# Patient Record
Sex: MALE | Race: WHITE | NOT HISPANIC OR LATINO | Employment: FULL TIME | URBAN - METROPOLITAN AREA
[De-identification: names, ages, dates, MRNs, and addresses within clinical notes are randomized per-mention and may not be internally consistent; named-entity substitution may affect disease eponyms.]

---

## 2023-03-22 ENCOUNTER — TELEPHONE (OUTPATIENT)
Dept: INTERNAL MEDICINE | Facility: CLINIC | Age: 38
End: 2023-03-22
Payer: COMMERCIAL

## 2023-03-22 NOTE — TELEPHONE ENCOUNTER
----- Message from Renuka Fowler sent at 3/21/2023  4:49 PM CDT -----  Regarding: np appoinment request  Name of Who is Calling:EWA MARTINEZ [[id number]]          What is the request in detail: np appointment requested           Can the clinic reply by [facility]: no             What Number to Call Back if not in [facility]: [phone] (home)

## 2023-03-22 NOTE — TELEPHONE ENCOUNTER
Called in attempt to get pt scheduled for an appt. Pt phone didn't ring and went right to the voicemail. Left a voicemail for the pt to call the office back to get an appt set up.

## 2023-05-03 NOTE — PROGRESS NOTES
Subjective:      Chief Complaint: Annual Exam and Establish Care    HPI  Mr. Akil Botello is a 36 yo M with reported (albeit transient) history of fatty liver disease presenting as a new Pt to establish primary care.  Of note, semi recently moved to [place] from [place] (works in oil and gas; [employer]), and has his 2nd son on the way any day now.    Warts:  - bilateral hands  - history of cryotherapy  - interested in dermatology (re)establishment    Family, social, surgical Hx reviewed     Health Maintenance:  Due for annual screening labs and vaccinations (COVID booster)    No past medical history on file.  Past Surgical History:   Procedure Laterality Date    GUM SURGERY      RETINAL DETACHMENT SURGERY       Family History   Problem Relation Age of Onset    Breast cancer Maternal Grandmother     Lung cancer Maternal Grandfather     Cancer Paternal Grandmother     Brain cancer Paternal Grandfather      Social History     Socioeconomic History    Marital status:    Tobacco Use    Smoking status: Never    Smokeless tobacco: Never     Review of patient's allergies indicates:  No Known Allergies  Akil Botello had no medications administered during this visit.      Review of Systems   Constitutional:  Negative for appetite change, chills and fever.   HENT: Negative.     Respiratory:  Negative for cough, chest tightness and shortness of breath.    Cardiovascular:  Negative for chest pain, palpitations and leg swelling.   Gastrointestinal:  Negative for abdominal distention, abdominal pain, blood in stool, constipation, diarrhea, nausea and vomiting.   Endocrine: Negative.    Genitourinary:  Negative for difficulty urinating, dysuria, frequency and hematuria.   Musculoskeletal: Negative.    Integumentary:  Positive for mole/lesion. Negative.   Neurological: Negative.    Psychiatric/Behavioral: Negative.         Objective:      Vitals:    05/04/23 0842   BP: 104/62   Pulse: 70   Resp: 16   Temp: 97.7 °F (36.5 °C)       Physical Exam  Vitals reviewed.   Constitutional:       General: He is not in acute distress.     Appearance: Normal appearance.   HENT:      Head: Normocephalic and atraumatic.      Comments: Facial features are symmetric      Nose: Nose normal. No congestion or rhinorrhea.      Mouth/Throat:      Mouth: Mucous membranes are moist.      Pharynx: Oropharynx is clear. No oropharyngeal exudate or posterior oropharyngeal erythema.   Eyes:      General: No scleral icterus.     Extraocular Movements: Extraocular movements intact.      Conjunctiva/sclera: Conjunctivae normal.   Cardiovascular:      Rate and Rhythm: Normal rate and regular rhythm.      Pulses: Normal pulses.      Heart sounds: Normal heart sounds.   Pulmonary:      Effort: Pulmonary effort is normal. No respiratory distress.      Breath sounds: Normal breath sounds.   Musculoskeletal:         General: No deformity or signs of injury. Normal range of motion.      Cervical back: Normal range of motion.      Comments: Gait normal    Skin:     General: Skin is warm and dry.      Findings: No rash.      Comments: Warts noted to bilateral hands   Neurological:      General: No focal deficit present.      Mental Status: He is alert and oriented to person, place, and time. Mental status is at baseline.   Psychiatric:         Mood and Affect: Mood normal.         Behavior: Behavior normal.         Thought Content: Thought content normal.     No current outpatient medications on file prior to visit.     No current facility-administered medications on file prior to visit.         Assessment:       1. Physical exam, annual    2. Viral warts, unspecified type        Plan:       Physical exam, annual  -     CBC Auto Differential; Future; Expected date: 05/04/2023  -     Comprehensive Metabolic Panel; Future; Expected date: 05/04/2023  -     Hemoglobin A1C; Future; Expected date: 05/04/2023  -     Cancel: Hepatitis C Antibody; Future; Expected date: 05/04/2023  -      HIV 1/2 Ag/Ab (4th Gen); Future; Expected date: 05/04/2023  -     Lipid Panel; Future; Expected date: 05/04/2023  -     T4; Future; Expected date: 05/04/2023  -     TSH; Future; Expected date: 05/04/2023  -     Vitamin D; Future; Expected date: 05/04/2023  -     Hepatitis Panel, Acute; Future; Expected date: 05/04/2023   - baseline/annual screening labs ordered    - given reported history of liver enzyme elevation, have changed isolated hepatitis-C screening to broader hepatitis panel    Viral warts, unspecified type  -     Ambulatory referral/consult to Dermatology; Future; Expected date: 05/11/2023   - recommendations and interventions appreciated      Return to clinic in one year for annual exam or sooner if dictated by labs or illness.

## 2023-05-04 ENCOUNTER — OFFICE VISIT (OUTPATIENT)
Dept: INTERNAL MEDICINE | Facility: CLINIC | Age: 38
End: 2023-05-04
Payer: COMMERCIAL

## 2023-05-04 VITALS
RESPIRATION RATE: 16 BRPM | OXYGEN SATURATION: 98 % | WEIGHT: 149.5 LBS | BODY MASS INDEX: 20.93 KG/M2 | HEIGHT: 71 IN | TEMPERATURE: 98 F | DIASTOLIC BLOOD PRESSURE: 62 MMHG | SYSTOLIC BLOOD PRESSURE: 104 MMHG | HEART RATE: 70 BPM

## 2023-05-04 DIAGNOSIS — B07.9 VIRAL WARTS, UNSPECIFIED TYPE: ICD-10-CM

## 2023-05-04 DIAGNOSIS — Z00.00 PHYSICAL EXAM, ANNUAL: Primary | ICD-10-CM

## 2023-05-04 PROCEDURE — 1159F MED LIST DOCD IN RCRD: CPT | Mod: CPTII,S$GLB,, | Performed by: STUDENT IN AN ORGANIZED HEALTH CARE EDUCATION/TRAINING PROGRAM

## 2023-05-04 PROCEDURE — 1159F PR MEDICATION LIST DOCUMENTED IN MEDICAL RECORD: ICD-10-PCS | Mod: CPTII,S$GLB,, | Performed by: STUDENT IN AN ORGANIZED HEALTH CARE EDUCATION/TRAINING PROGRAM

## 2023-05-04 PROCEDURE — 99395 PR PREVENTIVE VISIT,EST,18-39: ICD-10-PCS | Mod: S$GLB,,, | Performed by: STUDENT IN AN ORGANIZED HEALTH CARE EDUCATION/TRAINING PROGRAM

## 2023-05-04 PROCEDURE — 3008F PR BODY MASS INDEX (BMI) DOCUMENTED: ICD-10-PCS | Mod: CPTII,S$GLB,, | Performed by: STUDENT IN AN ORGANIZED HEALTH CARE EDUCATION/TRAINING PROGRAM

## 2023-05-04 PROCEDURE — 99999 PR PBB SHADOW E&M-EST. PATIENT-LVL IV: ICD-10-PCS | Mod: PBBFAC,,, | Performed by: STUDENT IN AN ORGANIZED HEALTH CARE EDUCATION/TRAINING PROGRAM

## 2023-05-04 PROCEDURE — 99999 PR PBB SHADOW E&M-EST. PATIENT-LVL IV: CPT | Mod: PBBFAC,,, | Performed by: STUDENT IN AN ORGANIZED HEALTH CARE EDUCATION/TRAINING PROGRAM

## 2023-05-04 PROCEDURE — 3008F BODY MASS INDEX DOCD: CPT | Mod: CPTII,S$GLB,, | Performed by: STUDENT IN AN ORGANIZED HEALTH CARE EDUCATION/TRAINING PROGRAM

## 2023-05-04 PROCEDURE — 99395 PREV VISIT EST AGE 18-39: CPT | Mod: S$GLB,,, | Performed by: STUDENT IN AN ORGANIZED HEALTH CARE EDUCATION/TRAINING PROGRAM

## 2023-05-04 PROCEDURE — 3074F PR MOST RECENT SYSTOLIC BLOOD PRESSURE < 130 MM HG: ICD-10-PCS | Mod: CPTII,S$GLB,, | Performed by: STUDENT IN AN ORGANIZED HEALTH CARE EDUCATION/TRAINING PROGRAM

## 2023-05-04 PROCEDURE — 3078F DIAST BP <80 MM HG: CPT | Mod: CPTII,S$GLB,, | Performed by: STUDENT IN AN ORGANIZED HEALTH CARE EDUCATION/TRAINING PROGRAM

## 2023-05-04 PROCEDURE — 3074F SYST BP LT 130 MM HG: CPT | Mod: CPTII,S$GLB,, | Performed by: STUDENT IN AN ORGANIZED HEALTH CARE EDUCATION/TRAINING PROGRAM

## 2023-05-04 PROCEDURE — 3078F PR MOST RECENT DIASTOLIC BLOOD PRESSURE < 80 MM HG: ICD-10-PCS | Mod: CPTII,S$GLB,, | Performed by: STUDENT IN AN ORGANIZED HEALTH CARE EDUCATION/TRAINING PROGRAM

## 2023-05-05 ENCOUNTER — LAB VISIT (OUTPATIENT)
Dept: LAB | Facility: OTHER | Age: 38
End: 2023-05-05
Attending: STUDENT IN AN ORGANIZED HEALTH CARE EDUCATION/TRAINING PROGRAM
Payer: COMMERCIAL

## 2023-05-05 DIAGNOSIS — Z00.00 PHYSICAL EXAM, ANNUAL: ICD-10-CM

## 2023-05-05 LAB
25(OH)D3+25(OH)D2 SERPL-MCNC: 40 NG/ML (ref 30–96)
ALBUMIN SERPL BCP-MCNC: 4.3 G/DL (ref 3.5–5.2)
ALP SERPL-CCNC: 52 U/L (ref 55–135)
ALT SERPL W/O P-5'-P-CCNC: 22 U/L (ref 10–44)
ANION GAP SERPL CALC-SCNC: 9 MMOL/L (ref 8–16)
AST SERPL-CCNC: 28 U/L (ref 10–40)
BASOPHILS # BLD AUTO: 0.04 K/UL (ref 0–0.2)
BASOPHILS NFR BLD: 0.9 % (ref 0–1.9)
BILIRUB SERPL-MCNC: 0.7 MG/DL (ref 0.1–1)
BUN SERPL-MCNC: 12 MG/DL (ref 6–20)
CALCIUM SERPL-MCNC: 9.4 MG/DL (ref 8.7–10.5)
CHLORIDE SERPL-SCNC: 102 MMOL/L (ref 95–110)
CHOLEST SERPL-MCNC: 153 MG/DL (ref 120–199)
CHOLEST/HDLC SERPL: 2.5 {RATIO} (ref 2–5)
CO2 SERPL-SCNC: 28 MMOL/L (ref 23–29)
CREAT SERPL-MCNC: 1 MG/DL (ref 0.5–1.4)
DIFFERENTIAL METHOD: ABNORMAL
EOSINOPHIL # BLD AUTO: 0.1 K/UL (ref 0–0.5)
EOSINOPHIL NFR BLD: 2 % (ref 0–8)
ERYTHROCYTE [DISTWIDTH] IN BLOOD BY AUTOMATED COUNT: 11.8 % (ref 11.5–14.5)
EST. GFR  (NO RACE VARIABLE): >60 ML/MIN/1.73 M^2
ESTIMATED AVG GLUCOSE: 94 MG/DL (ref 68–131)
GLUCOSE SERPL-MCNC: 88 MG/DL (ref 70–110)
HAV IGM SERPL QL IA: NORMAL
HBA1C MFR BLD: 4.9 % (ref 4–5.6)
HBV CORE IGM SERPL QL IA: NORMAL
HBV SURFACE AG SERPL QL IA: NORMAL
HCT VFR BLD AUTO: 45.9 % (ref 40–54)
HCV AB SERPL QL IA: NORMAL
HDLC SERPL-MCNC: 62 MG/DL (ref 40–75)
HDLC SERPL: 40.5 % (ref 20–50)
HGB BLD-MCNC: 16.1 G/DL (ref 14–18)
HIV 1+2 AB+HIV1 P24 AG SERPL QL IA: NORMAL
IMM GRANULOCYTES # BLD AUTO: 0 K/UL (ref 0–0.04)
IMM GRANULOCYTES NFR BLD AUTO: 0 % (ref 0–0.5)
LDLC SERPL CALC-MCNC: 77.2 MG/DL (ref 63–159)
LYMPHOCYTES # BLD AUTO: 2.8 K/UL (ref 1–4.8)
LYMPHOCYTES NFR BLD: 62.6 % (ref 18–48)
MCH RBC QN AUTO: 33.4 PG (ref 27–31)
MCHC RBC AUTO-ENTMCNC: 35.1 G/DL (ref 32–36)
MCV RBC AUTO: 95 FL (ref 82–98)
MONOCYTES # BLD AUTO: 0.5 K/UL (ref 0.3–1)
MONOCYTES NFR BLD: 11.3 % (ref 4–15)
NEUTROPHILS # BLD AUTO: 1 K/UL (ref 1.8–7.7)
NEUTROPHILS NFR BLD: 23.2 % (ref 38–73)
NONHDLC SERPL-MCNC: 91 MG/DL
NRBC BLD-RTO: 0 /100 WBC
PLATELET # BLD AUTO: 209 K/UL (ref 150–450)
PMV BLD AUTO: 9.6 FL (ref 9.2–12.9)
POTASSIUM SERPL-SCNC: 3.9 MMOL/L (ref 3.5–5.1)
PROT SERPL-MCNC: 7.1 G/DL (ref 6–8.4)
RBC # BLD AUTO: 4.82 M/UL (ref 4.6–6.2)
SODIUM SERPL-SCNC: 139 MMOL/L (ref 136–145)
T4 SERPL-MCNC: 5.9 UG/DL (ref 4.5–11.5)
TRIGL SERPL-MCNC: 69 MG/DL (ref 30–150)
TSH SERPL DL<=0.005 MIU/L-ACNC: 2.58 UIU/ML (ref 0.4–4)
WBC # BLD AUTO: 4.44 K/UL (ref 3.9–12.7)

## 2023-05-05 PROCEDURE — 80061 LIPID PANEL: CPT | Performed by: STUDENT IN AN ORGANIZED HEALTH CARE EDUCATION/TRAINING PROGRAM

## 2023-05-05 PROCEDURE — 80074 ACUTE HEPATITIS PANEL: CPT | Performed by: STUDENT IN AN ORGANIZED HEALTH CARE EDUCATION/TRAINING PROGRAM

## 2023-05-05 PROCEDURE — 80053 COMPREHEN METABOLIC PANEL: CPT | Performed by: STUDENT IN AN ORGANIZED HEALTH CARE EDUCATION/TRAINING PROGRAM

## 2023-05-05 PROCEDURE — 83036 HEMOGLOBIN GLYCOSYLATED A1C: CPT | Performed by: STUDENT IN AN ORGANIZED HEALTH CARE EDUCATION/TRAINING PROGRAM

## 2023-05-05 PROCEDURE — 87389 HIV-1 AG W/HIV-1&-2 AB AG IA: CPT | Performed by: STUDENT IN AN ORGANIZED HEALTH CARE EDUCATION/TRAINING PROGRAM

## 2023-05-05 PROCEDURE — 85025 COMPLETE CBC W/AUTO DIFF WBC: CPT | Performed by: STUDENT IN AN ORGANIZED HEALTH CARE EDUCATION/TRAINING PROGRAM

## 2023-05-05 PROCEDURE — 36415 COLL VENOUS BLD VENIPUNCTURE: CPT | Performed by: STUDENT IN AN ORGANIZED HEALTH CARE EDUCATION/TRAINING PROGRAM

## 2023-05-05 PROCEDURE — 84436 ASSAY OF TOTAL THYROXINE: CPT | Performed by: STUDENT IN AN ORGANIZED HEALTH CARE EDUCATION/TRAINING PROGRAM

## 2023-05-05 PROCEDURE — 84443 ASSAY THYROID STIM HORMONE: CPT | Performed by: STUDENT IN AN ORGANIZED HEALTH CARE EDUCATION/TRAINING PROGRAM

## 2023-05-05 PROCEDURE — 82306 VITAMIN D 25 HYDROXY: CPT | Performed by: STUDENT IN AN ORGANIZED HEALTH CARE EDUCATION/TRAINING PROGRAM

## 2023-07-24 ENCOUNTER — TELEPHONE (OUTPATIENT)
Dept: DERMATOLOGY | Facility: CLINIC | Age: 38
End: 2023-07-24
Payer: COMMERCIAL

## 2023-07-24 NOTE — TELEPHONE ENCOUNTER
Hi, unfortunately  is out of the office on Mondays moving forward. We've rescheduled your appointment to Friday 09/22/23. If the day and time do not work, please contact our office at [phone]. I have been unable to reach you by phone.        Thank you,  Alyce

## 2023-09-15 ENCOUNTER — OFFICE VISIT (OUTPATIENT)
Dept: DERMATOLOGY | Facility: CLINIC | Age: 38
End: 2023-09-15
Payer: COMMERCIAL

## 2023-09-15 ENCOUNTER — PATIENT MESSAGE (OUTPATIENT)
Dept: DERMATOLOGY | Facility: CLINIC | Age: 38
End: 2023-09-15

## 2023-09-15 DIAGNOSIS — Z12.83 SKIN EXAM, SCREENING FOR CANCER: ICD-10-CM

## 2023-09-15 DIAGNOSIS — B07.9 VIRAL WARTS, UNSPECIFIED TYPE: Primary | ICD-10-CM

## 2023-09-15 DIAGNOSIS — Z76.89 ENCOUNTER FOR SKIN CARE: ICD-10-CM

## 2023-09-15 DIAGNOSIS — L91.8 SKIN TAG: ICD-10-CM

## 2023-09-15 DIAGNOSIS — D22.9 MULTIPLE BENIGN NEVI: ICD-10-CM

## 2023-09-15 PROCEDURE — 1160F RVW MEDS BY RX/DR IN RCRD: CPT | Mod: CPTII,S$GLB,, | Performed by: DERMATOLOGY

## 2023-09-15 PROCEDURE — 17110 PR DESTRUCTION BENIGN LESIONS UP TO 14: ICD-10-PCS | Mod: S$GLB,,, | Performed by: DERMATOLOGY

## 2023-09-15 PROCEDURE — 17110 DESTRUCTION B9 LES UP TO 14: CPT | Mod: S$GLB,,, | Performed by: DERMATOLOGY

## 2023-09-15 PROCEDURE — 99999 PR PBB SHADOW E&M-EST. PATIENT-LVL II: CPT | Mod: PBBFAC,,, | Performed by: DERMATOLOGY

## 2023-09-15 PROCEDURE — 1159F MED LIST DOCD IN RCRD: CPT | Mod: CPTII,S$GLB,, | Performed by: DERMATOLOGY

## 2023-09-15 PROCEDURE — 99999 PR PBB SHADOW E&M-EST. PATIENT-LVL II: ICD-10-PCS | Mod: PBBFAC,,, | Performed by: DERMATOLOGY

## 2023-09-15 PROCEDURE — 1159F PR MEDICATION LIST DOCUMENTED IN MEDICAL RECORD: ICD-10-PCS | Mod: CPTII,S$GLB,, | Performed by: DERMATOLOGY

## 2023-09-15 PROCEDURE — 99203 OFFICE O/P NEW LOW 30 MIN: CPT | Mod: 25,S$GLB,, | Performed by: DERMATOLOGY

## 2023-09-15 PROCEDURE — 3044F PR MOST RECENT HEMOGLOBIN A1C LEVEL <7.0%: ICD-10-PCS | Mod: CPTII,S$GLB,, | Performed by: DERMATOLOGY

## 2023-09-15 PROCEDURE — 3044F HG A1C LEVEL LT 7.0%: CPT | Mod: CPTII,S$GLB,, | Performed by: DERMATOLOGY

## 2023-09-15 PROCEDURE — 1160F PR REVIEW ALL MEDS BY PRESCRIBER/CLIN PHARMACIST DOCUMENTED: ICD-10-PCS | Mod: CPTII,S$GLB,, | Performed by: DERMATOLOGY

## 2023-09-15 PROCEDURE — 99203 PR OFFICE/OUTPT VISIT, NEW, LEVL III, 30-44 MIN: ICD-10-PCS | Mod: 25,S$GLB,, | Performed by: DERMATOLOGY

## 2023-09-15 NOTE — PROGRESS NOTES
Subjective:      Patient ID:  Akil Botello is a 38 y.o. male who presents for   Chief Complaint   Patient presents with    Skin Check     TBSE     HPI    Review of Systems   Constitutional:  Negative for fever and chills.   Respiratory:  Negative for cough and shortness of breath.    Gastrointestinal:  Negative for nausea and vomiting.   Musculoskeletal:  Negative for joint swelling and arthralgias.   Skin:  Positive for daily sunscreen use (face) and activity-related sunscreen use. Negative for recent sunburn and wears hat.   All other systems reviewed and are negative.  Hematologic/Lymphatic: Does not bruise/bleed easily.       Objective:   Physical Exam   Constitutional: He appears well-developed and well-nourished. No distress.   Eyes: No conjunctival no injection.   Cardiovascular:  There is no dependent edema.             Neurological: He is alert and oriented to person, place, and time. He is not disoriented.   Psychiatric: He has a normal mood and affect.   Skin:   Areas Examined (abnormalities noted in diagram):   Scalp / Hair Palpated and Inspected  Head / Face Inspection Performed  Neck Inspection Performed  Chest / Axilla Inspection Performed  Abdomen Inspection Performed  Back Inspection Performed  RUE Inspected  LUE Inspection Performed  RLE Inspected  LLE Inspection Performed                         Diagram Legend     Erythematous scaling macule/papule c/w actinic keratosis       Vascular papule c/w angioma      Pigmented verrucoid papule/plaque c/w seborrheic keratosis      Yellow umbilicated papule c/w sebaceous hyperplasia      Irregularly shaped tan macule c/w lentigo     1-2 mm smooth white papules consistent with Milia      Movable subcutaneous cyst with punctum c/w epidermal inclusion cyst      Subcutaneous movable cyst c/w pilar cyst      Firm pink to brown papule c/w dermatofibroma      Pedunculated fleshy papule(s) c/w skin tag(s)      Evenly pigmented macule c/w junctional nevus      Mildly variegated pigmented, slightly irregular-bordered macule c/w mildly atypical nevus      Flesh colored to evenly pigmented papule c/w intradermal nevus       Pink pearly papule/plaque c/w basal cell carcinoma      Erythematous hyperkeratotic cursted plaque c/w SCC      Surgical scar with no sign of skin cancer recurrence      Open and closed comedones      Inflammatory papules and pustules      Verrucoid papule consistent consistent with wart     Erythematous eczematous patches and plaques     Dystrophic onycholytic nail with subungual debris c/w onychomycosis     Umbilicated papule    Erythematous-base heme-crusted tan verrucoid plaque consistent with inflamed seborrheic keratosis     Erythematous Silvery Scaling Plaque c/w Psoriasis     See annotation      Assessment / Plan:        Viral warts, unspecified type  -     Ambulatory referral/consult to Dermatology  Reviewed with patient different treatment options and associated risks.  Cryosurgery procedure note:    Verbal consent from the patient is obtained including, but not limited to, risk of hypopigmentation/hyperpigmentation, scar, recurrence of lesion. Liquid nitrogen cryosurgery is applied to 9 verruca with prior paring, as detailed in the physical exam, to produce a freeze injury. 3 consecutive freeze thaw cycles are applied to each verruca. The patient is aware that blisters (possibly blood blisters) may form.    Skin exam, screening for cancer  No other seriously suspicious lesions noted for body areas examined today.  Patient to inform of us if they notice any dark or changing or suspicious spots in areas not examined today.  Follow up for routine monitoring recommended to patient.    Instructed patient to watch out for dark spots, bleeding spots, crusty spots, sores that break out repeatedly in the same spot.  These characteristics are risk factors for skin cancer, and patient is to notify us if they experience any of these symptoms.    Encounter  for skin care  Patient instructed in importance in daily sun protection. Sun avoidance and topical protection discussed.     Patient encouraged to wear hat for all outdoor exposure.     Also discussed sun protective clothing.  Pt can call in prn for retin a script.  Pt to research what he wants to achieve with his skin of the face.      Multiple benign nevi  Discussed all of the following with the patient.    Patient to check their breasts (if applicable), buttocks, and groin with a mirror.  Patient deferred our examination of these areas today.    Each month, check your body for any spots such as freckles, age spots, and moles.  Watch for color changes and shape changes and growth in size.    Have your avendano or  pay attention to any dark color changes to moles of the scalp.    Moles, also called nevi, are small, colored (pigmented) marks on the skin. They have no known purpose. Many moles appear before age 30, but they also increase frequently as people age. Moles most often are not cancer (benign) and are harmless. But some become cancerous (malignant). Thats why you need to watch the moles on your body and tell your healthcare provider about any that concern you.  Brochure given for patient education.  Patient can monitor these lesions or lesion with their primary care provider during routine visits and notify us if there are any changes for reevaluation.  At a minimum they should keep their annual visits with their primary for check ups.    Skin tag  Discussed with patient the benign nature of these lesions and that no treatment is indicated.             Follow up in about 2 years (around 9/15/2025) for TBSE.

## 2023-09-15 NOTE — PATIENT INSTRUCTIONS
Discussed all of the following with the patient.    Patient to check their breasts (if applicable), buttocks, and groin with a mirror.  Patient deferred our examination of these areas today.    Each month, check your body for any spots such as freckles, age spots, and moles.  Watch for color changes and shape changes and growth in size.    Have your avendano or  pay attention to any dark color changes to moles of the scalp.    Moles, also called nevi, are small, colored (pigmented) marks on the skin. They have no known purpose. Many moles appear before age 30, but they also increase frequently as people age. Moles most often are not cancer (benign) and are harmless. But some become cancerous (malignant). Thats why you need to watch the moles on your body and tell your healthcare provider about any that concern you.    Patient instructed in importance in daily sun protection. Sun avoidance and topical protection discussed.     Patient encouraged to wear hat for all outdoor exposure.     Also discussed sun protective clothing.    Recommend sun block with plain zinc oxide or sun block products with some zinc or titanium as their base with an spf of at least 30 to be applied every 2-3 hours of outdoor exposure.  CRYOSURGERY      Your doctor has used a method called cryosurgery to treat your skin condition. Cryosurgery refers to the use of very cold substances to treat a variety of skin conditions such as warts, pre-skin cancers, molluscum contagiosum, sun spots, and several benign growths. The substance we use in cryosurgery is liquid nitrogen and is so cold (-195 degrees Celsius) that is burns when administered.     Following treatment in the office, the skin may immediately burn and become red. You may find the area around the lesion is affected as well. It is sometimes necessary to treat not only the lesion, but a small area of the surrounding normal skin to achieve a good response.     A blister, and even  a blood filled blister, may form after treatment.   This is a normal response. If the blister is painful, it is acceptable to sterilize a needle and with rubbing alcohol and gently pop the blister. It is important that you gently wash the area with soap and warm water as the blister fluid may contain wart virus if a wart was treated. Do no remove the roof of the blister.     The area treated can take anywhere from 1-3 weeks to heal. Healing time depends on the kind skin lesion treated, the location, and how aggressively the lesion was treated. It is recommended that the areas treated are covered with Vaseline or bacitracin ointment and a band-aid. If a band-aid is not practical, just ointment applied several times per day will do. Keeping these areas moist will speed the healing time.    Treatment with liquid nitrogen can leave a scar. In dark skin, it may be a light or dark scar, in light skin it may be a white or pink scar. These will generally fade with time, but may never go away completely.     If you have any concerns after your treatment, please feel free to call the office.       [facility] [place] [address]/ [phone] FAX/ [email or web address]

## 2023-09-20 RX ORDER — TRETINOIN 0.5 MG/G
CREAM TOPICAL NIGHTLY
Qty: 20 G | Refills: 3 | Status: SHIPPED | OUTPATIENT
Start: 2023-09-20 | End: 2024-03-04

## 2024-03-04 ENCOUNTER — OFFICE VISIT (OUTPATIENT)
Dept: INTERNAL MEDICINE | Facility: CLINIC | Age: 39
End: 2024-03-04
Payer: COMMERCIAL

## 2024-03-04 VITALS
HEIGHT: 70 IN | WEIGHT: 150.56 LBS | SYSTOLIC BLOOD PRESSURE: 122 MMHG | BODY MASS INDEX: 21.55 KG/M2 | OXYGEN SATURATION: 99 % | HEART RATE: 64 BPM | DIASTOLIC BLOOD PRESSURE: 78 MMHG

## 2024-03-04 DIAGNOSIS — K13.0 LESION OF LIP: Primary | ICD-10-CM

## 2024-03-04 PROCEDURE — 3078F DIAST BP <80 MM HG: CPT | Mod: CPTII,S$GLB,, | Performed by: INTERNAL MEDICINE

## 2024-03-04 PROCEDURE — 3008F BODY MASS INDEX DOCD: CPT | Mod: CPTII,S$GLB,, | Performed by: INTERNAL MEDICINE

## 2024-03-04 PROCEDURE — 1159F MED LIST DOCD IN RCRD: CPT | Mod: CPTII,S$GLB,, | Performed by: INTERNAL MEDICINE

## 2024-03-04 PROCEDURE — 3074F SYST BP LT 130 MM HG: CPT | Mod: CPTII,S$GLB,, | Performed by: INTERNAL MEDICINE

## 2024-03-04 PROCEDURE — 99214 OFFICE O/P EST MOD 30 MIN: CPT | Mod: S$GLB,,, | Performed by: INTERNAL MEDICINE

## 2024-03-04 PROCEDURE — 99999 PR PBB SHADOW E&M-EST. PATIENT-LVL IV: CPT | Mod: PBBFAC,,, | Performed by: INTERNAL MEDICINE

## 2024-03-04 RX ORDER — VALACYCLOVIR HYDROCHLORIDE 1 G/1
1000 TABLET, FILM COATED ORAL 2 TIMES DAILY PRN
Qty: 60 TABLET | Refills: 5 | Status: SHIPPED | OUTPATIENT
Start: 2024-03-04

## 2024-03-04 NOTE — PROGRESS NOTES
INTERNAL MEDICINE CLINIC - SAME DAY APPOINTMENT  Progress Note    PRESENTING HISTORY     PCP: Harmeet Isaacs MD    Chief Complaint/Reason for Visit:     Chief Complaint   Patient presents with    rash lip      History of Present Illness & ROS : Mr. Akil Botello is a 38 y.o. male.      Rash around lips for few months.    No pain. Has dry lips.      PAST HISTORY:     No past medical history on file.    Past Surgical History:   Procedure Laterality Date    GUM SURGERY      RETINAL DETACHMENT SURGERY         Family History   Problem Relation Age of Onset    Breast cancer Maternal Grandmother     Lung cancer Maternal Grandfather     Cancer Paternal Grandmother     Brain cancer Paternal Grandfather        Social History     Socioeconomic History    Marital status:    Tobacco Use    Smoking status: Never    Smokeless tobacco: Never     Social Determinants of Health     Financial Resource Strain: Low Risk  (3/4/2024)    Overall Financial Resource Strain (CARDIA)     Difficulty of Paying Living Expenses: Not hard at all   Food Insecurity: No Food Insecurity (3/4/2024)    Hunger Vital Sign     Worried About Running Out of Food in the Last Year: Never true     Ran Out of Food in the Last Year: Never true   Transportation Needs: No Transportation Needs (3/4/2024)    PRAPARE - Transportation     Lack of Transportation (Medical): No     Lack of Transportation (Non-Medical): No   Physical Activity: Sufficiently Active (3/4/2024)    Exercise Vital Sign     Days of Exercise per Week: 7 days     Minutes of Exercise per Session: 30 min   Stress: Stress Concern Present (3/4/2024)    [employer] [place] [employer] - Occupational Stress Questionnaire     Feeling of Stress : To some extent   Social Connections: Unknown (3/4/2024)    Social Connection and Isolation Panel [NHANES]     Frequency of Communication with Friends and Family: Twice a week     Frequency of Social Gatherings with Friends and Family: Once a  week     Active Member of Clubs or Organizations: Yes     Attends Club or Organization Meetings: More than 4 times per year     Marital Status:    Housing Stability: Low Risk  (3/4/2024)    Housing Stability Vital Sign     Unable to Pay for Housing in the Last Year: No     Number of Places Lived in the Last Year: 1     Unstable Housing in the Last Year: No       MEDICATIONS & ALLERGIES:     No current outpatient medications on file prior to visit.     No current facility-administered medications on file prior to visit.        Review of patient's allergies indicates:  No Known Allergies    Medications Reconciliation:   I have reconciled the patient's home medications with the patient/family. I have updated all changes.  Refer to After-Visit Medication List.    OBJECTIVE:     Vital Signs:  Vitals:    03/04/24 1557   BP: 122/78   Pulse: 64     Wt Readings from Last 3 Encounters:   03/04/24 1557 68.3 kg (150 lb 9.2 oz)   05/04/23 0842 67.8 kg (149 lb 7.6 oz)     Body mass index is 21.61 kg/m².     Physical Exam:  General: Well developed, well nourished. No distress.  HEENT: Head is normocephalic, atraumatic  Eyes: Clear conjunctiva.  Neck: Supple, symmetrical neck; trachea midline.  Lungs:normal respiratory effort.  Musculoskeletal: Normal gait.   Psychiatric: Normal affect. Alert.              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    Plan:  -     valACYclovir (VALTREX) 1000 MG tablet; Take 1 tablet (1,000 mg total) by mouth 2 (two) times daily as needed (oral lesions).   Dispense: 60 tablet; Refill: 5    If not better with treatment one week, then will refer to Dermatology.     Instructions for the patient:    Take 2 tablets every 12 hour X 2 occasions (for each attack). OR take 1 tablet twice daily until resolved.    Take B-complex vitamin daily.    After Visit Medication List :     Medication List            Accurate as of March 4, 2024 11:59 PM. If you have any questions, ask your nurse or doctor.                START taking these medications      valACYclovir 1000 MG tablet  Commonly known as: VALTREX  Take 1 tablet (1,000 mg total) by mouth 2 (two) times daily as needed (oral lesions).  Started by: Jaspreet Hayes MD            STOP taking these medications      tretinoin 0.05 % cream  Commonly known as: RETIN-A  Stopped by: Jaspreet Hayes MD               Where to Get Your Medications        These medications were sent to [id number] [address] [facility] [address]      Phone: [phone]   valACYclovir 1000 MG tablet         Signing Physician:  Jaspreet Hayes MD

## 2024-03-04 NOTE — PATIENT INSTRUCTIONS
Take 2 tablets every 12 hour X 2 occasions (for each attack). OR take 1 tablet twice daily until resolved.    Take B-complex vitamin daily.

## 2024-03-11 ENCOUNTER — PATIENT MESSAGE (OUTPATIENT)
Dept: INTERNAL MEDICINE | Facility: CLINIC | Age: 39
End: 2024-03-11
Payer: COMMERCIAL

## 2024-03-13 ENCOUNTER — TELEPHONE (OUTPATIENT)
Dept: HEPATOLOGY | Facility: CLINIC | Age: 39
End: 2024-03-13
Payer: COMMERCIAL

## 2024-03-13 ENCOUNTER — PATIENT MESSAGE (OUTPATIENT)
Dept: INTERNAL MEDICINE | Facility: CLINIC | Age: 39
End: 2024-03-13
Payer: COMMERCIAL

## 2024-03-13 DIAGNOSIS — K13.0 RASH ON LIPS: Primary | ICD-10-CM

## 2024-03-14 ENCOUNTER — OFFICE VISIT (OUTPATIENT)
Dept: DERMATOLOGY | Facility: CLINIC | Age: 39
End: 2024-03-14
Payer: COMMERCIAL

## 2024-03-14 DIAGNOSIS — K13.0 ANGULAR CHEILITIS: ICD-10-CM

## 2024-03-14 DIAGNOSIS — L71.0 PERIORAL DERMATITIS: Primary | ICD-10-CM

## 2024-03-14 PROCEDURE — 1159F MED LIST DOCD IN RCRD: CPT | Mod: CPTII,S$GLB,, | Performed by: DERMATOLOGY

## 2024-03-14 PROCEDURE — 1160F RVW MEDS BY RX/DR IN RCRD: CPT | Mod: CPTII,S$GLB,, | Performed by: DERMATOLOGY

## 2024-03-14 PROCEDURE — 99213 OFFICE O/P EST LOW 20 MIN: CPT | Mod: S$GLB,,, | Performed by: DERMATOLOGY

## 2024-03-14 PROCEDURE — 99999 PR PBB SHADOW E&M-EST. PATIENT-LVL III: CPT | Mod: PBBFAC,,, | Performed by: DERMATOLOGY

## 2024-03-14 RX ORDER — KETOCONAZOLE 20 MG/G
CREAM TOPICAL
Qty: 30 G | Refills: 1 | Status: SHIPPED | OUTPATIENT
Start: 2024-03-14

## 2024-03-14 NOTE — PROGRESS NOTES
Subjective:      Patient ID:  Akil Botello is a 38 y.o. male who presents for   Chief Complaint   Patient presents with    Acne     Face      Pt presents today for follow up after seeing his PCP and being treated for cold sores in the corners of his mouth. Reports associated breaking of the skin in the corners of his mouth. He was treated with Valtrex and has been using Aquaphor and neosporin. Reports very mild burning surrounding his lips after taking the Valtrex. Denies any pain with it.  Denies any changes in products or medications. Reports stress as of lately that could be a contributing factor.       Review of Systems   Skin:  Positive for itching, rash, daily sunscreen use, activity-related sunscreen use and wears hat. Negative for dry skin and recent sunburn.   Hematologic/Lymphatic: Does not bruise/bleed easily.       Objective:   Physical Exam   Constitutional: He appears well-developed and well-nourished. No distress.   Neurological: He is alert and oriented to person, place, and time. He is not disoriented.   Psychiatric: He has a normal mood and affect.   Skin:   Areas Examined (abnormalities noted in diagram):   Head / Face Inspection Performed            Diagram Legend     Erythematous scaling macule/papule c/w actinic keratosis       Vascular papule c/w angioma      Pigmented verrucoid papule/plaque c/w seborrheic keratosis      Yellow umbilicated papule c/w sebaceous hyperplasia      Irregularly shaped tan macule c/w lentigo     1-2 mm smooth white papules consistent with Milia      Movable subcutaneous cyst with punctum c/w epidermal inclusion cyst      Subcutaneous movable cyst c/w pilar cyst      Firm pink to brown papule c/w dermatofibroma      Pedunculated fleshy papule(s) c/w skin tag(s)      Evenly pigmented macule c/w junctional nevus     Mildly variegated pigmented, slightly irregular-bordered macule c/w mildly atypical nevus      Flesh colored to evenly pigmented papule c/w intradermal  nevus       Pink pearly papule/plaque c/w basal cell carcinoma      Erythematous hyperkeratotic cursted plaque c/w SCC      Surgical scar with no sign of skin cancer recurrence      Open and closed comedones      Inflammatory papules and pustules      Verrucoid papule consistent consistent with wart     Erythematous eczematous patches and plaques     Dystrophic onycholytic nail with subungual debris c/w onychomycosis     Umbilicated papule    Erythematous-base heme-crusted tan verrucoid plaque consistent with inflamed seborrheic keratosis     Erythematous Silvery Scaling Plaque c/w Psoriasis     See annotation      Assessment / Plan:        Perioral dermatitis/Angular cheilitis   -     Ambulatory referral/consult to Dermatology  -     ketoconazole (NIZORAL) 2 % cream; AAA in the corners of mouth bid for at least 7-10 days  Dispense: 30 g; Refill: 1  -  Also given Exelderm sample to use BID in the corners of the mouth 7-10 days; discussed using whichever is easier to get in the beard area  - Discussed that he should let us know if he is not getting relief in 7-10 days in case an antibiotic needs to be ordered              Follow up if symptoms worsen or fail to improve.

## 2024-03-14 NOTE — PATIENT INSTRUCTIONS
Medications prescribed/given today:  -     ketoconazole (NIZORAL) 2 % cream; AAA in the corners of mouth bid for at least 7-10 days  Dispense: 30 g; Refill: 1  -  Also given Exelderm sample to use BID in the corners of the mouth 7-10 days; discussed using whichever is easier to get in the beard area    Reach out in 7-10 days to let us know how the cream is working out in case an antibiotic doxycycline  needs to be ordered.

## 2024-03-15 ENCOUNTER — PATIENT MESSAGE (OUTPATIENT)
Dept: DERMATOLOGY | Facility: CLINIC | Age: 39
End: 2024-03-15
Payer: COMMERCIAL

## 2024-03-15 DIAGNOSIS — L71.0 PERIORAL DERMATITIS: Primary | ICD-10-CM

## 2024-03-27 RX ORDER — DOXYCYCLINE HYCLATE 100 MG
TABLET ORAL
Qty: 14 TABLET | Refills: 1 | Status: SHIPPED | OUTPATIENT
Start: 2024-03-27

## 2024-12-07 NOTE — PROGRESS NOTES
32-year-old male   Presents for an annual routine visit    Overall in general he has felt well.    Medical history   Retinal detachment surgery years ago   Prior gum surgery    Social history   Tobacco use none   Alcohol use couple drinks a week   Exercise on a regular basis with the aerobic and resistant training    Family history   Mother hypertension   Father no major health condition   No siblings      Medications   None    Review of symptoms   Negative for chest pain, palpitations, shortness for breath, abdominal pain.  Regular bowel function, in the past he has had bouts of rectal bleeding but the last was 6 months ago.  No difficulty urinating occasional nocturia.  Occasional heartburn not frequent   No arthralgias headaches    Examination   Weight 147 lb   BMI 21.16   Pulse 68   Blood pressure 122/62   HEENT exam, no abnormal findings   Neck, no thyromegaly no masses   Chest, clear breath sounds good effort   Heart, regular rate rhythm  Abdominal exam, soft nontender no hepatosplenomegaly abdominal masses bruits  Pulses 2+ carotid pulses no bruits 2+ dorsalis pedal pulses   Extremities, no edema   Lymph gland, no palpable adenopathy   Genitalia no scrotal masses no hernias   Skin, no gross abnormal findings   Musculoskeletal no gross abnormal findings    Impression   General examination    Plan   Routine labs of chemistry lipid CBC TSH   Continue with attention appropriate diet physical activity

## 2024-12-09 ENCOUNTER — OFFICE VISIT (OUTPATIENT)
Dept: INTERNAL MEDICINE | Facility: CLINIC | Age: 39
End: 2024-12-09
Payer: COMMERCIAL

## 2024-12-09 VITALS
WEIGHT: 147.5 LBS | HEART RATE: 72 BPM | OXYGEN SATURATION: 98 % | BODY MASS INDEX: 21.16 KG/M2 | SYSTOLIC BLOOD PRESSURE: 116 MMHG | DIASTOLIC BLOOD PRESSURE: 64 MMHG

## 2024-12-09 DIAGNOSIS — Z00.00 PHYSICAL EXAM, ANNUAL: Primary | ICD-10-CM

## 2024-12-09 PROCEDURE — 1160F RVW MEDS BY RX/DR IN RCRD: CPT | Mod: CPTII,S$GLB,, | Performed by: INTERNAL MEDICINE

## 2024-12-09 PROCEDURE — 99395 PREV VISIT EST AGE 18-39: CPT | Mod: S$GLB,,, | Performed by: INTERNAL MEDICINE

## 2024-12-09 PROCEDURE — 3074F SYST BP LT 130 MM HG: CPT | Mod: CPTII,S$GLB,, | Performed by: INTERNAL MEDICINE

## 2024-12-09 PROCEDURE — 3078F DIAST BP <80 MM HG: CPT | Mod: CPTII,S$GLB,, | Performed by: INTERNAL MEDICINE

## 2024-12-09 PROCEDURE — 1159F MED LIST DOCD IN RCRD: CPT | Mod: CPTII,S$GLB,, | Performed by: INTERNAL MEDICINE

## 2024-12-09 PROCEDURE — 3008F BODY MASS INDEX DOCD: CPT | Mod: CPTII,S$GLB,, | Performed by: INTERNAL MEDICINE

## 2024-12-09 PROCEDURE — 99999 PR PBB SHADOW E&M-EST. PATIENT-LVL III: CPT | Mod: PBBFAC,,, | Performed by: INTERNAL MEDICINE

## 2024-12-13 ENCOUNTER — LAB VISIT (OUTPATIENT)
Dept: LAB | Facility: OTHER | Age: 39
End: 2024-12-13
Attending: INTERNAL MEDICINE
Payer: COMMERCIAL

## 2024-12-13 ENCOUNTER — PATIENT MESSAGE (OUTPATIENT)
Dept: INTERNAL MEDICINE | Facility: CLINIC | Age: 39
End: 2024-12-13
Payer: COMMERCIAL

## 2024-12-13 DIAGNOSIS — Z00.00 PHYSICAL EXAM, ANNUAL: ICD-10-CM

## 2024-12-13 LAB
ALBUMIN SERPL BCP-MCNC: 4.3 G/DL (ref 3.5–5.2)
ALP SERPL-CCNC: 62 U/L (ref 40–150)
ALT SERPL W/O P-5'-P-CCNC: 33 U/L (ref 10–44)
ANION GAP SERPL CALC-SCNC: 9 MMOL/L (ref 8–16)
AST SERPL-CCNC: 36 U/L (ref 10–40)
BASOPHILS # BLD AUTO: 0.04 K/UL (ref 0–0.2)
BASOPHILS NFR BLD: 0.6 % (ref 0–1.9)
BILIRUB SERPL-MCNC: 0.8 MG/DL (ref 0.1–1)
BUN SERPL-MCNC: 12 MG/DL (ref 6–20)
CALCIUM SERPL-MCNC: 9.9 MG/DL (ref 8.7–10.5)
CHLORIDE SERPL-SCNC: 103 MMOL/L (ref 95–110)
CHOLEST SERPL-MCNC: 147 MG/DL (ref 120–199)
CHOLEST/HDLC SERPL: 2.3 {RATIO} (ref 2–5)
CO2 SERPL-SCNC: 26 MMOL/L (ref 23–29)
CREAT SERPL-MCNC: 1 MG/DL (ref 0.5–1.4)
DIFFERENTIAL METHOD BLD: ABNORMAL
EOSINOPHIL # BLD AUTO: 0.2 K/UL (ref 0–0.5)
EOSINOPHIL NFR BLD: 2.3 % (ref 0–8)
ERYTHROCYTE [DISTWIDTH] IN BLOOD BY AUTOMATED COUNT: 11.7 % (ref 11.5–14.5)
EST. GFR  (NO RACE VARIABLE): >60 ML/MIN/1.73 M^2
GLUCOSE SERPL-MCNC: 89 MG/DL (ref 70–110)
HCT VFR BLD AUTO: 47.7 % (ref 40–54)
HDLC SERPL-MCNC: 64 MG/DL (ref 40–75)
HDLC SERPL: 43.5 % (ref 20–50)
HGB BLD-MCNC: 16.3 G/DL (ref 14–18)
IMM GRANULOCYTES # BLD AUTO: 0.02 K/UL (ref 0–0.04)
IMM GRANULOCYTES NFR BLD AUTO: 0.3 % (ref 0–0.5)
LDLC SERPL CALC-MCNC: 71.4 MG/DL (ref 63–159)
LYMPHOCYTES # BLD AUTO: 2.8 K/UL (ref 1–4.8)
LYMPHOCYTES NFR BLD: 40.1 % (ref 18–48)
MCH RBC QN AUTO: 32.3 PG (ref 27–31)
MCHC RBC AUTO-ENTMCNC: 34.2 G/DL (ref 32–36)
MCV RBC AUTO: 95 FL (ref 82–98)
MONOCYTES # BLD AUTO: 0.9 K/UL (ref 0.3–1)
MONOCYTES NFR BLD: 13.2 % (ref 4–15)
NEUTROPHILS # BLD AUTO: 3 K/UL (ref 1.8–7.7)
NEUTROPHILS NFR BLD: 43.5 % (ref 38–73)
NONHDLC SERPL-MCNC: 83 MG/DL
NRBC BLD-RTO: 0 /100 WBC
PLATELET # BLD AUTO: 237 K/UL (ref 150–450)
PMV BLD AUTO: 10.3 FL (ref 9.2–12.9)
POTASSIUM SERPL-SCNC: 4.7 MMOL/L (ref 3.5–5.1)
PROT SERPL-MCNC: 7.3 G/DL (ref 6–8.4)
RBC # BLD AUTO: 5.05 M/UL (ref 4.6–6.2)
SODIUM SERPL-SCNC: 138 MMOL/L (ref 136–145)
TRIGL SERPL-MCNC: 58 MG/DL (ref 30–150)
TSH SERPL DL<=0.005 MIU/L-ACNC: 1.84 UIU/ML (ref 0.4–4)
WBC # BLD AUTO: 6.91 K/UL (ref 3.9–12.7)

## 2024-12-13 PROCEDURE — 84443 ASSAY THYROID STIM HORMONE: CPT | Performed by: INTERNAL MEDICINE

## 2024-12-13 PROCEDURE — 36415 COLL VENOUS BLD VENIPUNCTURE: CPT | Performed by: INTERNAL MEDICINE

## 2024-12-13 PROCEDURE — 85025 COMPLETE CBC W/AUTO DIFF WBC: CPT | Performed by: INTERNAL MEDICINE

## 2024-12-13 PROCEDURE — 80053 COMPREHEN METABOLIC PANEL: CPT | Performed by: INTERNAL MEDICINE

## 2024-12-13 PROCEDURE — 80061 LIPID PANEL: CPT | Performed by: INTERNAL MEDICINE
